# Patient Record
Sex: FEMALE | Race: WHITE | HISPANIC OR LATINO | URBAN - METROPOLITAN AREA
[De-identification: names, ages, dates, MRNs, and addresses within clinical notes are randomized per-mention and may not be internally consistent; named-entity substitution may affect disease eponyms.]

---

## 2022-07-06 ENCOUNTER — HOSPITAL ENCOUNTER (EMERGENCY)
Facility: HOSPITAL | Age: 67
Discharge: HOME | End: 2022-07-07

## 2022-07-06 DIAGNOSIS — I10 HYPERTENSION: Primary | ICD-10-CM

## 2022-07-06 DIAGNOSIS — R51.9 NONINTRACTABLE HEADACHE, UNSPECIFIED CHRONICITY PATTERN, UNSPECIFIED HEADACHE TYPE: ICD-10-CM

## 2022-07-06 PROCEDURE — 93005 ELECTROCARDIOGRAM TRACING: CPT

## 2022-07-06 PROCEDURE — 93010 ELECTROCARDIOGRAM REPORT: CPT | Mod: ,,, | Performed by: INTERNAL MEDICINE

## 2022-07-06 PROCEDURE — 99285 EMERGENCY DEPT VISIT HI MDM: CPT | Mod: 25

## 2022-07-06 PROCEDURE — 84443 ASSAY THYROID STIM HORMONE: CPT

## 2022-07-06 PROCEDURE — 81003 URINALYSIS AUTO W/O SCOPE: CPT

## 2022-07-06 PROCEDURE — 83735 ASSAY OF MAGNESIUM: CPT

## 2022-07-06 PROCEDURE — 63600175 PHARM REV CODE 636 W HCPCS

## 2022-07-06 PROCEDURE — 93010 EKG 12-LEAD: ICD-10-PCS | Mod: ,,, | Performed by: INTERNAL MEDICINE

## 2022-07-06 PROCEDURE — 84439 ASSAY OF FREE THYROXINE: CPT

## 2022-07-06 PROCEDURE — 96374 THER/PROPH/DIAG INJ IV PUSH: CPT

## 2022-07-06 PROCEDURE — 80053 COMPREHEN METABOLIC PANEL: CPT

## 2022-07-06 RX ORDER — HYDRALAZINE HYDROCHLORIDE 20 MG/ML
10 INJECTION INTRAMUSCULAR; INTRAVENOUS
Status: COMPLETED | OUTPATIENT
Start: 2022-07-06 | End: 2022-07-06

## 2022-07-06 RX ADMIN — HYDRALAZINE HYDROCHLORIDE 10 MG: 20 INJECTION, SOLUTION INTRAMUSCULAR; INTRAVENOUS at 11:07

## 2022-07-07 VITALS
OXYGEN SATURATION: 96 % | TEMPERATURE: 98 F | HEART RATE: 65 BPM | DIASTOLIC BLOOD PRESSURE: 72 MMHG | SYSTOLIC BLOOD PRESSURE: 126 MMHG | RESPIRATION RATE: 18 BRPM | WEIGHT: 160 LBS

## 2022-07-07 LAB
ALBUMIN SERPL BCP-MCNC: 3.8 G/DL (ref 3.5–5.2)
ALP SERPL-CCNC: 78 U/L (ref 55–135)
ALT SERPL W/O P-5'-P-CCNC: 26 U/L (ref 10–44)
ANION GAP SERPL CALC-SCNC: 13 MMOL/L (ref 8–16)
AST SERPL-CCNC: 19 U/L (ref 10–40)
BASOPHILS # BLD AUTO: 0.04 K/UL (ref 0–0.2)
BASOPHILS NFR BLD: 0.5 % (ref 0–1.9)
BILIRUB SERPL-MCNC: 0.5 MG/DL (ref 0.1–1)
BILIRUB UR QL STRIP: NEGATIVE
BUN SERPL-MCNC: 11 MG/DL (ref 8–23)
CALCIUM SERPL-MCNC: 10.4 MG/DL (ref 8.7–10.5)
CHLORIDE SERPL-SCNC: 98 MMOL/L (ref 95–110)
CLARITY UR: CLEAR
CO2 SERPL-SCNC: 25 MMOL/L (ref 23–29)
COLOR UR: COLORLESS
CREAT SERPL-MCNC: 0.7 MG/DL (ref 0.5–1.4)
DIFFERENTIAL METHOD: ABNORMAL
EOSINOPHIL # BLD AUTO: 0.1 K/UL (ref 0–0.5)
EOSINOPHIL NFR BLD: 1.3 % (ref 0–8)
ERYTHROCYTE [DISTWIDTH] IN BLOOD BY AUTOMATED COUNT: 13.5 % (ref 11.5–14.5)
EST. GFR  (AFRICAN AMERICAN): >60 ML/MIN/1.73 M^2
EST. GFR  (NON AFRICAN AMERICAN): >60 ML/MIN/1.73 M^2
GLUCOSE SERPL-MCNC: 102 MG/DL (ref 70–110)
GLUCOSE UR QL STRIP: NEGATIVE
HCT VFR BLD AUTO: 39.6 % (ref 37–48.5)
HGB BLD-MCNC: 13.4 G/DL (ref 12–16)
HGB UR QL STRIP: NEGATIVE
IMM GRANULOCYTES # BLD AUTO: 0.01 K/UL (ref 0–0.04)
IMM GRANULOCYTES NFR BLD AUTO: 0.1 % (ref 0–0.5)
KETONES UR QL STRIP: NEGATIVE
LEUKOCYTE ESTERASE UR QL STRIP: NEGATIVE
LYMPHOCYTES # BLD AUTO: 3.3 K/UL (ref 1–4.8)
LYMPHOCYTES NFR BLD: 38.9 % (ref 18–48)
MAGNESIUM SERPL-MCNC: 2.3 MG/DL (ref 1.6–2.6)
MCH RBC QN AUTO: 26.3 PG (ref 27–31)
MCHC RBC AUTO-ENTMCNC: 33.8 G/DL (ref 32–36)
MCV RBC AUTO: 78 FL (ref 82–98)
MONOCYTES # BLD AUTO: 0.5 K/UL (ref 0.3–1)
MONOCYTES NFR BLD: 6.2 % (ref 4–15)
NEUTROPHILS # BLD AUTO: 4.6 K/UL (ref 1.8–7.7)
NEUTROPHILS NFR BLD: 53 % (ref 38–73)
NITRITE UR QL STRIP: NEGATIVE
NRBC BLD-RTO: 0 /100 WBC
PH UR STRIP: 7 [PH] (ref 5–8)
PLATELET # BLD AUTO: 215 K/UL (ref 150–450)
PLATELET BLD QL SMEAR: ABNORMAL
PMV BLD AUTO: 11.5 FL (ref 9.2–12.9)
POTASSIUM SERPL-SCNC: 4 MMOL/L (ref 3.5–5.1)
PROT SERPL-MCNC: 7.4 G/DL (ref 6–8.4)
PROT UR QL STRIP: NEGATIVE
RBC # BLD AUTO: 5.1 M/UL (ref 4–5.4)
SODIUM SERPL-SCNC: 136 MMOL/L (ref 136–145)
SP GR UR STRIP: 1 (ref 1–1.03)
T4 FREE SERPL-MCNC: 1.11 NG/DL (ref 0.71–1.51)
TSH SERPL DL<=0.005 MIU/L-ACNC: 0.99 UIU/ML (ref 0.4–4)
URN SPEC COLLECT METH UR: ABNORMAL
UROBILINOGEN UR STRIP-ACNC: NEGATIVE EU/DL
WBC # BLD AUTO: 8.59 K/UL (ref 3.9–12.7)

## 2022-07-07 PROCEDURE — 25000003 PHARM REV CODE 250

## 2022-07-07 RX ORDER — LOSARTAN POTASSIUM 25 MG/1
25 TABLET ORAL ONCE
Status: COMPLETED | OUTPATIENT
Start: 2022-07-07 | End: 2022-07-07

## 2022-07-07 RX ORDER — LOSARTAN POTASSIUM AND HYDROCHLOROTHIAZIDE 12.5; 5 MG/1; MG/1
1 TABLET ORAL DAILY
Qty: 30 TABLET | Refills: 0 | Status: SHIPPED | OUTPATIENT
Start: 2022-07-07

## 2022-07-07 RX ADMIN — LOSARTAN POTASSIUM 25 MG: 25 TABLET, FILM COATED ORAL at 01:07

## 2022-07-07 NOTE — DISCHARGE INSTRUCTIONS
COMMUNITY CLINICS     Aaron Rock   [address]   [phone]     Vinicio Segal   [address] [facility]   [phone]     Dipesh Van    [address]   [phone]     Zak Cancino   [address] [facility]   [phone]     [facility]   [address] for HIV  Patients   [phone]     OTHER    [facility]   [address]   [phone]     [facility]   [address]   [phone]     [facility]   [address] [facility]   [phone]     [facility]   [address] [facility]   [phone]     [facility]   [address]  (Mon- Wed       Fri 1-5pm)   [phone]     [facility]    [phone]     [facility]   [address]   [phone]     [facility]   [address] [facility]   [phone]     [facility]   [address]   [phone]     MENTAL HEALTH    [facility]   [address] [facility]   [phone]     [facility]   [address] [place] [address]   [phone]     [facility]   [address] [facility]   [phone]     [facility]   [address]   [phone]     [facility]   [address] [place] [address]   [phone]     [facility]   [address] [facility]   [phone]     [facility]    [address] [place] [address] [phone]     [facility]    [phone]         MEDICARE AND MEDICAID SERVICES                                [phone]     [facility]    [facility] All Specialties    [phone]     Medicine Clinic   [address]   [phone]     Dermatology   [address]   [phone]     Ophthalmology Clinic   [address]   [phone]     Primary Care   [address]   [phone]     Infectious Disease   [address]   [phone]     [facility]   [address] [facility] [address]   [phone]     [facility]  Practice    [phone]     [facility]   [address] [facility]   [phone]     [facility]   [address] [facility]   [phone] and [phone]

## 2022-07-07 NOTE — FIRST PROVIDER EVALUATION
" Emergency Department TeleTriage Encounter Note      CHIEF COMPLAINT    Chief Complaint   Patient presents with    Headache     C/o headache, dizziness and HTN x 3 weeks, seen in ED for [place] [facility], states "they found nothing but my pressure has still been high", home bp machine was reading 175/135 at home       VITAL SIGNS   Initial Vitals [07/06/22 1841]   BP Pulse Resp Temp SpO2   (!) 144/95 98 18 98.3 °F (36.8 °C) 98 %      MAP       --            ALLERGIES    Review of patient's allergies indicates:  No Known Allergies    PROVIDER TRIAGE NOTE  This is a teletriage evaluation of a 66 y.o. female presenting to the ED with c/o dizziness, headache and elevated SAUL for the past 3 weeks . Pt is out of her SAUL medication.     PE: Slightly hypertensive.  Language barrier    Plan: monitor    Limited physical exam via telehealth: The patient is awake, alert, answering questions appropriately and is not in respiratory distress. All ED beds are full at present; patient notified of this status.  Patient seen and medically screened by Nurse Practitioner via teletriage.      Initial orders will be placed and care will be transferred to an alternate provider when patient is roomed for a full evaluation. Any additional orders and the final disposition will be determined by that provider.         ORDERS  Labs Reviewed - No data to display    ED Orders (720h ago, onward)    None            Virtual Visit Note: The provider triage portion of this emergency department evaluation and documentation was performed via [employer], a HIPAA-compliant telemedicine application, in concert with a tele-presenter in the room. A face to face patient evaluation with one of my colleagues will occur once the patient is placed in an emergency department room.      DISCLAIMER: This note was prepared with [employer] voice recognition transcription software. Garbled syntax, mangled pronouns, and other bizarre constructions may be attributed to that " software system.

## 2022-07-07 NOTE — ED PROVIDER NOTES
"Encounter Date: 7/6/2022       History     Chief Complaint   Patient presents with    Headache     C/o headache, dizziness and HTN x 3 weeks, seen in ED for [place] for same, states "they found nothing but my pressure has still been high", home bp machine was reading 175/135 at home     HPI   Patient with history of HTN presenting to ED for evaluation of headache, dizziness, and elevated BP over the past 3 weeks.  Patient says she has been taking her losartan 25 mg daily and continues to have high BP.  She reports going to ED while in [place] about 2 weeks ago for these complaints, says they did testing and told her "everything is fine" before discharging her without any changes to her home medications.  That ED advised patient to follow up with PCP, but patient says she does not currently have a PCP so has not been able to follow up as outpatient.  Patient has been trying to eat healthy, limit her salt intake, drink plenty of fluids without improvement in her symptoms.  No other specific complaints at this time.      Review of patient's allergies indicates:  No Known Allergies  History reviewed. No pertinent past medical history.  No past surgical history on file.  History reviewed. No pertinent family history.     Review of Systems   Constitutional: Negative for chills and fever.   HENT: Negative for congestion and rhinorrhea.    Eyes: Negative for pain.   Respiratory: Negative for cough and shortness of breath.    Cardiovascular: Negative for chest pain.   Gastrointestinal: Negative for abdominal pain, diarrhea, nausea and vomiting.   Genitourinary: Negative for dysuria.   Musculoskeletal: Negative for back pain and myalgias.   Allergic/Immunologic: Negative for immunocompromised state.   Neurological: Positive for dizziness, light-headedness and headaches.   Hematological: Does not bruise/bleed easily.       Physical Exam     Initial Vitals [07/06/22 1841]   BP Pulse Resp Temp SpO2   (!) 144/95 98 18 98.3 °F (36.8 " °C) 98 %      MAP       --         Physical Exam    Constitutional: She appears well-developed. No distress.   HENT:   Head: Normocephalic.   Mouth/Throat: Oropharynx is clear and moist.   Eyes: EOM are normal. Pupils are equal, round, and reactive to light.   Neck:   Normal range of motion.  Cardiovascular: Normal rate, regular rhythm and normal heart sounds.   Pulmonary/Chest: Breath sounds normal. No respiratory distress.   Abdominal: Abdomen is soft. Bowel sounds are normal. There is no abdominal tenderness.   Musculoskeletal:         General: No tenderness or edema. Normal range of motion.      Cervical back: Normal range of motion.     Neurological: She is alert and oriented to person, place, and time. She has normal strength. No cranial nerve deficit or sensory deficit. GCS score is 15. GCS eye subscore is 4. GCS verbal subscore is 5. GCS motor subscore is 6.   Skin: Skin is warm and dry.   Psychiatric: She has a normal mood and affect.         ED Course   Procedures  Labs Reviewed   URINALYSIS - Abnormal; Notable for the following components:       Result Value    Color, UA Colorless (*)     All other components within normal limits   CBC W/ AUTO DIFFERENTIAL - Abnormal; Notable for the following components:    MCV 78 (*)     MCH 26.3 (*)     All other components within normal limits   COMPREHENSIVE METABOLIC PANEL   TSH   MAGNESIUM   T4, FREE          Imaging Results          CT Head Without Contrast (Final result)  Result time 07/06/22 23:42:15    Final result by Eliana Avila MD (07/06/22 23:42:15)                 Impression:      No CT evidence of acute intracranial abnormality. If the patient's headaches are sufficiently clinically suspicious, or associated with signs of elevated ICP, focal neurologic deficits, nausea, or vomiting, further evaluation with MRI can be performed if there are no clinical contraindications.      Electronically signed by: Eliana Avila MD  Date:    07/06/2022  Time:    23:42              Narrative:    EXAMINATION:  CT HEAD WITHOUT CONTRAST    CLINICAL HISTORY:  Recurrent headaches, elevated BP;    TECHNIQUE:  Low dose axial images were obtained through the head.  Coronal and sagittal reformations were also performed. Contrast was not administered.    COMPARISON:  None.    FINDINGS:  There is no acute intracranial hemorrhage, hydrocephalus, midline shift or mass effect. Gray-white matter differentiation appears maintained. The basal cisterns are patent. The mastoid air cells visualized paranasal sinuses are clear of acute process noting because retention cyst versus polyps of the visualized maxillary antra bilaterally.  The visualized bones of the calvarium demonstrate no acute osseous abnormality.                                 Medications   hydrALAZINE injection 10 mg (10 mg Intravenous Given 7/6/22 5517)   losartan tablet 25 mg (25 mg Oral Given 7/7/22 0130)                 ED Course as of 07/07/22 0307   Wed Jul 06, 2022 2319 EKG 12-lead  EKG: Time 2259.  Rate 59.  Sinus bradycardia.  T wave inversion in lead III, T wave flattening in leads V2 and V3.  Nonspecific EKG, no significant arrhythmia, no STEMI. [KB]      ED Course User Index  [KB] Dejon Saeed MD            MDM:  EKG without acute ischemic changes, CT head without acute intracranial process.  BP did go up to 206/86 while in ED, was given dose of hydralazine with improvement.  Patient reported being on low dose losartan 25 mg, plan to increase this to 50 mg and also add HCTZ.  Outpatient referral for PCP initiated in ED, also provided list of local clinics for outpatient follow up.  Does not appear to be indication for further emergent testing, observation, or hospitalization at this time.  Patient appears stable for and is comfortable with discharge home.  Advised to follow-up with PCP for outpatient recheck.  Signs and symptoms that would warrant immediate return to ED were reviewed prior to discharge.      Clinical  Impression:   Final diagnoses:  [I10] Hypertension (Primary)  [R51.9] Nonintractable headache, unspecified chronicity pattern, unspecified headache type        ED Disposition Condition    Discharge Stable        ED Prescriptions     Medication Sig Dispense Start Date End Date Auth. Provider    losartan-hydrochlorothiazide 50-12.5 mg (HYZAAR) 50-12.5 mg per tablet Take 1 tablet by mouth once daily. 30 tablet 7/7/2022  Dejon Saeed MD        Follow-up Information     Follow up With Specialties Details Why Contact Info    Primary care physician  Schedule an appointment as soon as possible for a visit  Follow up with your primary care physician for outpatient recheck.  If you don't have a primary care, use the list of clinics provided to establish one.     [facility] - Emergency Dept Emergency Medicine  Return to the ED sooner for any new or worsening symptoms or for any other concerns. [address] [facility] [address]  [phone]           Dejon Saeed MD  07/07/22 1838

## 2022-07-07 NOTE — ED NOTES
Pt arrives with complaints of a headache, right arm pain, dizziness, and high blood pressure that has been present for the last 3 weeks now. Pt states she is here from [place] and does not have a primary care doctor. States she was seen and the doctor found nothing serious but her blood pressure has still been high. Pt denies any chest pain and dyspnea. Normal respiratory drive noted. Pt is alert and oriented x4. Continuous cardaic monitor applied to pt and bed in lowest, locked position with side rails up and call light within reach. Will continue to monitor.